# Patient Record
Sex: MALE | Race: WHITE | NOT HISPANIC OR LATINO | Employment: FULL TIME | URBAN - METROPOLITAN AREA
[De-identification: names, ages, dates, MRNs, and addresses within clinical notes are randomized per-mention and may not be internally consistent; named-entity substitution may affect disease eponyms.]

---

## 2017-01-03 RX ORDER — NARATRIPTAN 2.5 MG/1
TABLET ORAL
Qty: 12 TAB | Refills: 2 | Status: SHIPPED | OUTPATIENT
Start: 2017-01-03 | End: 2019-01-07

## 2017-01-03 NOTE — TELEPHONE ENCOUNTER
Was the patient seen in the last year in this department? Yes  4/21/16    Does patient have an active prescription for medications requested? Yes     Received Request Via: Pharmacy     No follow up scheduled at this time.

## 2019-01-07 ENCOUNTER — OFFICE VISIT (OUTPATIENT)
Dept: NEUROLOGY | Facility: MEDICAL CENTER | Age: 44
End: 2019-01-07
Payer: COMMERCIAL

## 2019-01-07 ENCOUNTER — APPOINTMENT (OUTPATIENT)
Dept: NEUROLOGY | Facility: MEDICAL CENTER | Age: 44
End: 2019-01-07
Payer: COMMERCIAL

## 2019-01-07 VITALS
DIASTOLIC BLOOD PRESSURE: 72 MMHG | HEIGHT: 73 IN | WEIGHT: 214 LBS | BODY MASS INDEX: 28.36 KG/M2 | RESPIRATION RATE: 18 BRPM | TEMPERATURE: 97.1 F | HEART RATE: 96 BPM | SYSTOLIC BLOOD PRESSURE: 114 MMHG | OXYGEN SATURATION: 96 %

## 2019-01-07 PROCEDURE — 99213 OFFICE O/P EST LOW 20 MIN: CPT | Performed by: NURSE PRACTITIONER

## 2019-01-07 RX ORDER — KETOROLAC TROMETHAMINE 15.75 MG/1
1-2 SPRAY, METERED NASAL EVERY 8 HOURS PRN
Qty: 5 EACH | Refills: 5 | Status: SHIPPED | OUTPATIENT
Start: 2019-01-07 | End: 2019-05-28

## 2019-01-07 RX ORDER — ACETAMINOPHEN 325 MG/1
650 TABLET ORAL EVERY 4 HOURS PRN
Status: ON HOLD | COMMUNITY
End: 2019-06-03

## 2019-01-07 RX ORDER — IBUPROFEN 200 MG
200 TABLET ORAL EVERY 6 HOURS PRN
COMMUNITY

## 2019-01-07 RX ORDER — KETOROLAC TROMETHAMINE 10 MG/1
10 TABLET, FILM COATED ORAL EVERY 6 HOURS PRN
Qty: 20 TAB | Refills: 1 | Status: SHIPPED | OUTPATIENT
Start: 2019-01-07 | End: 2019-05-28

## 2019-01-07 ASSESSMENT — ENCOUNTER SYMPTOMS
DOUBLE VISION: 0
DEPRESSION: 0
MUSCULOSKELETAL NEGATIVE: 1
NAUSEA: 0
SEIZURES: 0
COUGH: 0
ABDOMINAL PAIN: 0
SORE THROAT: 0
HEADACHES: 1
NERVOUS/ANXIOUS: 0
VOMITING: 0
DIARRHEA: 0
CONSTITUTIONAL NEGATIVE: 1

## 2019-01-07 ASSESSMENT — PATIENT HEALTH QUESTIONNAIRE - PHQ9: CLINICAL INTERPRETATION OF PHQ2 SCORE: 0

## 2019-01-07 ASSESSMENT — PAIN SCALES - GENERAL: PAINLEVEL: 2=MINIMAL-SLIGHT

## 2019-01-07 NOTE — PROGRESS NOTES
Subjective:      Joseph Sloan is a 43 y.o. male who presents with Follow-Up (Chronic migraine)        Last seen almost 3 years ago for diagnosis of migraines.      HPI Migraines are occurring nearly every day.    Migraines will almost always begin by 2pm and consist of a tightness/band-like pain that is in the frontal region of his head.    Tried and failed TPX, and two beta-blockers.  Also, failed a calcium-channel blocker.    No apparent improvement with using Amerge or Zomig NS.    Only using OTC medications for abortive treatment.  If he is able to take a NSAID at the onset of headaches it will keep the headache at a minimum.    No relief with caffeine.    Current Outpatient Prescriptions   Medication Sig Dispense Refill   • acetaminophen (TYLENOL) 325 MG Tab Take 650 mg by mouth every four hours as needed.     • ibuprofen (MOTRIN) 200 MG Tab Take 200 mg by mouth every 6 hours as needed.     • naratriptan (AMERGE) 2.5 MG tablet TAKE 1 TABLET BY MOUTH AS NEEDED FOR MIGRAINE FOR UP TO 1 DOSE (Patient not taking: Reported on 1/7/2019) 12 Tab 2   • sumatriptan (IMITREX) 100 MG tablet Take 100 mg by mouth Once PRN for Migraine.     • cyclobenzaprine (FLEXERIL) 10 MG Tab Take 10 mg by mouth 3 times a day as needed.     • zolmitriptan (ZOMIG) 5 MG nasal solution Use one spray at onset of headache, may repeat dose in 2 hours if unrelieved. (Patient not taking: Reported on 1/7/2019) 12 Each 2     No current facility-administered medications for this visit.        Review of Systems   Constitutional: Negative.    HENT: Negative for hearing loss, nosebleeds and sore throat.         No recent head injury.   Eyes: Negative for double vision.        No new loss of vision.   Respiratory: Negative for cough.         No recent lung infections.   Cardiovascular: Negative for chest pain.   Gastrointestinal: Negative for abdominal pain, diarrhea, nausea and vomiting.   Genitourinary: Negative.    Musculoskeletal: Negative.    Skin:  "Negative.    Neurological: Positive for headaches. Negative for seizures.   Endo/Heme/Allergies:        No history of endocrine dysfunction.  No new problems.   Psychiatric/Behavioral: Negative for depression. The patient is not nervous/anxious.         No recent mood changes.          Objective:     /72   Pulse 96   Temp 36.2 °C (97.1 °F)   Resp 18   Ht 1.854 m (6' 1\")   Wt 97.1 kg (214 lb)   SpO2 96%   BMI 28.23 kg/m²      Physical Exam   Constitutional: He is oriented to person, place, and time. He appears well-developed and well-nourished.   HENT:   Head: Normocephalic and atraumatic.   Nose: Nose normal.   No new hearing loss.   Eyes: Pupils are equal, round, and reactive to light.   No double vision or loss of vision.   Neck: Normal range of motion.   Cardiovascular: Normal rate and regular rhythm.    No recent chest pain.   Pulmonary/Chest: Effort normal.   Abdominal: There is no tenderness.   Genitourinary:   Genitourinary Comments: No recent infections.   Musculoskeletal: Normal range of motion.   Lymphadenopathy:     He has no cervical adenopathy.   Neurological: He is alert and oriented to person, place, and time. He has normal strength and normal reflexes. No cranial nerve deficit.   No observable changes in neurologic status.  See initial new patient examination for details.     Skin: Skin is warm and dry.   Psychiatric: He has a normal mood and affect. His speech is normal. His mood appears not anxious. He does not exhibit a depressed mood.               Assessment/Plan:     Chronic migraine:  Long-standing history of headaches since childhood.  Continues to experience at least 4 migraine days per week, total of 16+ per month.  Unrelieved with over-the-counter medications.     Ms Young has chronic daily migraines, defined as having 15 or more headaches days per month over a minimum of the last three months. Episodes last more than 4 hours (if untreated). Previous treatments for at least " three months have included two different beta-blockers such as propranolol, anti-epileptics such as Topamax, NSAIDs such as naproxen, and multiple triptans. The patients has not responded to any of these treatments. At this point the only option left would be to use BTX injections for chronic migraine.    He does not appreciate the effect of triptans.  New Rx for Toradol and Sprix (NS Toradol) are provided today.    Return for follow-up with initiation of Botox or consideration of the CGRP modulator would be an option.

## 2019-02-19 ENCOUNTER — OFFICE VISIT (OUTPATIENT)
Dept: NEUROLOGY | Facility: MEDICAL CENTER | Age: 44
End: 2019-02-19
Payer: COMMERCIAL

## 2019-02-19 VITALS
OXYGEN SATURATION: 96 % | WEIGHT: 231 LBS | BODY MASS INDEX: 30.62 KG/M2 | HEIGHT: 73 IN | TEMPERATURE: 97.7 F | SYSTOLIC BLOOD PRESSURE: 134 MMHG | DIASTOLIC BLOOD PRESSURE: 88 MMHG | HEART RATE: 99 BPM

## 2019-02-19 PROCEDURE — 64615 CHEMODENERV MUSC MIGRAINE: CPT | Performed by: NURSE PRACTITIONER

## 2019-02-19 NOTE — PROGRESS NOTES
"Subjective:      Joseph Sloan is a 43 y.o. male who presents with Procedure (botox)            HPI  Here for Botox initiation today.    Current Outpatient Prescriptions   Medication Sig Dispense Refill   • acetaminophen (TYLENOL) 325 MG Tab Take 650 mg by mouth every four hours as needed.     • ibuprofen (MOTRIN) 200 MG Tab Take 200 mg by mouth every 6 hours as needed.     • ketorolac (TORADOL) 10 MG Tab Take 1 Tab by mouth every 6 hours as needed for Moderate Pain. 20 Tab 1   • Ketorolac Tromethamine (SPRIX) 15.75 MG/SPRAY Solution Spray 1-2 Sprays in nose every 8 hours as needed. Do not exceed more than 2 sprays in 24 hours. 5 Each 5     Current Facility-Administered Medications   Medication Dose Route Frequency Provider Last Rate Last Dose   • onabotulinum toxin type A SOLR 155 Units  155 Units Injection Once ZELALEM Solares       Objective:     /88   Pulse 99   Temp 36.5 °C (97.7 °F) (Temporal)   Ht 1.854 m (6' 1\")   Wt 104.8 kg (231 lb)   SpO2 96%   BMI 30.48 kg/m²      Physical Exam            Assessment/Plan:     Chronic Migraine:  Here for initiation of Botox today.    I treated this patient in clinic today with BotoxA 155 units according to the dosing/injection paradigm currently mandated by the FDA for the management of chronic migraine. Specifically, I injected 5 units to the procerus, 5 units to the corrugators bilaterally, a total of 20 units to the frontalis musculature, 20 units to the temporalis bilaterally, 15 units to the occipitalis bilaterally, 10 units to the cervical paraspinals bilaterally and 15 units to the trapezius musculature bilaterally. The remainder of the Botox 200 units mixed but not administered was discarded as wastage per FDA guidelines.    Return for follow-up in 3 months for 2nd set of Botox.          "

## 2019-05-14 ENCOUNTER — OFFICE VISIT (OUTPATIENT)
Dept: NEUROLOGY | Facility: MEDICAL CENTER | Age: 44
End: 2019-05-14
Payer: COMMERCIAL

## 2019-05-14 VITALS
HEART RATE: 83 BPM | WEIGHT: 231 LBS | HEIGHT: 73 IN | OXYGEN SATURATION: 98 % | SYSTOLIC BLOOD PRESSURE: 128 MMHG | BODY MASS INDEX: 30.62 KG/M2 | RESPIRATION RATE: 16 BRPM | DIASTOLIC BLOOD PRESSURE: 74 MMHG | TEMPERATURE: 98.7 F

## 2019-05-14 PROCEDURE — 64615 CHEMODENERV MUSC MIGRAINE: CPT | Performed by: NURSE PRACTITIONER

## 2019-05-14 NOTE — PROGRESS NOTES
Subjective:      Joseph Sloan is a 43 y.o. male who presents with Botox Injection (Chronic migraine)            HPI  Here for Botox today.    Has noticed an improvement in frequency of migraines.  Headache free for the past 2 weeks.    Current Outpatient Prescriptions   Medication Sig Dispense Refill   • acetaminophen (TYLENOL) 325 MG Tab Take 650 mg by mouth every four hours as needed.     • ibuprofen (MOTRIN) 200 MG Tab Take 200 mg by mouth every 6 hours as needed.     • ketorolac (TORADOL) 10 MG Tab Take 1 Tab by mouth every 6 hours as needed for Moderate Pain. 20 Tab 1   • Ketorolac Tromethamine (SPRIX) 15.75 MG/SPRAY Solution Spray 1-2 Sprays in nose every 8 hours as needed. Do not exceed more than 2 sprays in 24 hours. (Patient not taking: Reported on 5/14/2019) 5 Each 5     No current facility-administered medications for this visit.            ROS       Objective:     There were no vitals taken for this visit.     Physical Exam            Assessment/Plan:     Chronic Migraine:  Botox therapy has reduced patient’s migraines by more than 7 days and/or 100 hours per month.     I treated this patient in clinic today with BotoxA 155 units according to the dosing/injection paradigm currently mandated by the FDA for the management of chronic migraine. Specifically, I injected 5 units to the procerus, 5 units to the corrugators bilaterally, a total of 20 units to the frontalis musculature, 20 units to the temporalis bilaterally, 15 units to the occipitalis bilaterally, 10 units to the cervical paraspinals bilaterally and 15 units to the trapezius musculature bilaterally. The remainder of the Botox 200 units mixed but not administered was discarded as wastage per FDA guidelines.     Education/counseling/reduction in medications if pt has medication overuse headache; Frequency of headaches is >15 days monthly with at least 8 migraines monthly; Migraines include at least two of the following: worsened with activity or  avoidance of activity with migraines (ie they go lie down), moderate to severe pain intensity, pulsing headache, unilateral headache AND they have either nausea or vomiting OR sensitivity to light and sound.     Although this patient is responding to botox they are NOT migraine free, however, and it is my recommendation botox be continued at this time.    Return for follow-up in 3 months for continuation of Botox, 3rd set.

## 2019-06-03 PROBLEM — S43.431A SUPERIOR GLENOID LABRUM LESION OF RIGHT SHOULDER: Status: ACTIVE | Noted: 2019-06-03

## 2019-08-06 ENCOUNTER — OFFICE VISIT (OUTPATIENT)
Dept: NEUROLOGY | Facility: MEDICAL CENTER | Age: 44
End: 2019-08-06
Payer: COMMERCIAL

## 2019-08-06 VITALS
DIASTOLIC BLOOD PRESSURE: 74 MMHG | HEIGHT: 73 IN | HEART RATE: 75 BPM | BODY MASS INDEX: 28.49 KG/M2 | WEIGHT: 215 LBS | SYSTOLIC BLOOD PRESSURE: 126 MMHG | TEMPERATURE: 98.5 F | OXYGEN SATURATION: 98 % | RESPIRATION RATE: 16 BRPM

## 2019-08-06 PROCEDURE — 64615 CHEMODENERV MUSC MIGRAINE: CPT | Performed by: NURSE PRACTITIONER

## 2019-08-06 RX ORDER — CYCLOBENZAPRINE HCL 10 MG
5-10 TABLET ORAL
Qty: 30 TAB | Refills: 0 | Status: SHIPPED | OUTPATIENT
Start: 2019-08-06 | End: 2019-10-04 | Stop reason: SDUPTHER

## 2019-08-06 RX ORDER — KETOROLAC TROMETHAMINE 30 MG/ML
60 INJECTION, SOLUTION INTRAMUSCULAR; INTRAVENOUS ONCE
Status: COMPLETED | OUTPATIENT
Start: 2019-08-06 | End: 2019-08-06

## 2019-08-06 RX ADMIN — KETOROLAC TROMETHAMINE 60 MG: 30 INJECTION, SOLUTION INTRAMUSCULAR; INTRAVENOUS at 10:09

## 2019-08-06 NOTE — PROGRESS NOTES
"Subjective:      Joseph Sloan is a 43 y.o. male who presents with Botox Injection (Chronic migraine)            HPI  Here for Botox today.    Notices muscle tension building to migraines for 2-3 weeks prior to Botox and at least 10 days after each Botox injection set.      Current Outpatient Medications   Medication Sig Dispense Refill   • cyclobenzaprine (FLEXERIL) 10 MG Tab Take 0.5-1 Tabs by mouth 1 time daily as needed. 30 Tab 0   • acetaminophen (TYLENOL) 500 MG Tab Take 2 Tabs by mouth every 8 hours. Take for pain relief     • onabotulinum toxin type A (BOTOX) 100 UNIT Recon Soln 100 Units by Intramuscular route every 90 days.     • ibuprofen (MOTRIN) 200 MG Tab Take 200 mg by mouth every 6 hours as needed.     • docusate sodium (COLACE) 100 MG Cap Take 1 Cap by mouth 2 times a day. Take while taking narcotic medication after surgery 60 Cap 0   • diphenhydrAMINE (BENADRYL) 25 MG capsule Take 1 Cap by mouth every 6 hours as needed for Itching. 30 Cap      Current Facility-Administered Medications   Medication Dose Route Frequency Provider Last Rate Last Dose   • ketorolac (TORADOL) injection 60 mg  60 mg Intramuscular Once Maryan Valentine A.PFilomenaNFilomena PRINCE       Objective:     /74 (BP Location: Left arm, Patient Position: Sitting, BP Cuff Size: Adult)   Pulse 75   Temp 36.9 °C (98.5 °F) (Temporal)   Resp 16   Ht 1.854 m (6' 0.99\")   Wt 97.5 kg (215 lb)   SpO2 98%   BMI 28.37 kg/m²      Physical Exam            Assessment/Plan:       Chronic Migraine:  Botox therapy has reduced patient’s migraines by more than 7 days and/or 100 hours per month.  Additionally pt has noted a reduction in the amount of medication they are taking to treat their migraines and/or they are having a reduction in intractable migraine/status migrainosis which can result in urgent care or ER visits.    Documentation of patient's symptoms with migraine are included in the initial note with this patient including the " following:  Education/counseling/reduction in medications if pt has medication overuse headache;  Frequency of headaches is >15 days monthly with at least 8 migraines monthly;  Migraines include at least two of the following: worsened with activity or avoidance of activity with migraines (ie they go lie down), moderate to severe pain intensity, pulsing headache, unilateral headache AND they have either nausea or vomiting OR sensitivity to light and sound    Although this patient is responding to botox they are NOT migraine free, however, and it is my recommendation botox be continued at this time    I treated this patient in clinic today with BotoxA 155 units according to the dosing/injection paradigm currently mandated by the FDA for the management of chronic migraine. Specifically, I injected 5 units to the procerus, 5 units to the corrugators bilaterally, a total of 20 units to the frontalis musculature, 20 units to the temporalis bilaterally, 15 units to the occipitalis bilaterally, 10 units to the cervical paraspinals bilaterally and 15 units to the trapezius musculature bilaterally. The remainder of the Botox 200 units mixed but not administered was discarded as wastage per FDA guidelines.    Toradol 60mg IM provided per MA.    New Rx for cyclobenzaprine 10mg tablets provided.  May consider skelaxin if he receives minimal benefit from the flexeril.    Return for follow-up in 12 weeks for serial set of Botox.

## 2019-10-29 ENCOUNTER — OFFICE VISIT (OUTPATIENT)
Dept: NEUROLOGY | Facility: MEDICAL CENTER | Age: 44
End: 2019-10-29
Payer: COMMERCIAL

## 2019-10-29 VITALS
HEIGHT: 73 IN | DIASTOLIC BLOOD PRESSURE: 90 MMHG | OXYGEN SATURATION: 96 % | HEART RATE: 84 BPM | SYSTOLIC BLOOD PRESSURE: 140 MMHG | WEIGHT: 223 LBS | BODY MASS INDEX: 29.55 KG/M2 | RESPIRATION RATE: 16 BRPM | TEMPERATURE: 98.4 F

## 2019-10-29 PROCEDURE — 64615 CHEMODENERV MUSC MIGRAINE: CPT | Performed by: NURSE PRACTITIONER

## 2019-10-29 NOTE — PROGRESS NOTES
Subjective:      Joseph Sloan is a 43 y.o. male who presents with No chief complaint on file.            HPI  Here for Botox today.     Notices muscle tension building to migraines for 2-3 weeks prior to Botox and at least 10 days after each Botox injection set.     Current Outpatient Medications   Medication Sig Dispense Refill   • cyclobenzaprine (FLEXERIL) 10 MG Tab TAKE 1/2 TO 1 TABLET BY MOUTH EVERY DAY AS NEEDED 15 Tab 0   • acetaminophen (TYLENOL) 500 MG Tab Take 2 Tabs by mouth every 8 hours. Take for pain relief     • onabotulinum toxin type A (BOTOX) 100 UNIT Recon Soln 100 Units by Intramuscular route every 90 days.     • ibuprofen (MOTRIN) 200 MG Tab Take 200 mg by mouth every 6 hours as needed.       Current Facility-Administered Medications   Medication Dose Route Frequency Provider Last Rate Last Dose   • onabotulinum toxin type A SOLR 155 Units  155 Units Injection Once MARKUS SolaresPCHELSEA PRINCE       Objective:     There were no vitals taken for this visit.     Physical Exam            Assessment/Plan:     Chronic Migraine:  Botox therapy has reduced patient’s migraines by more than 7 days and/or 100 hours per month.  Additionally pt has noted a reduction in the amount of medication they are taking to treat their migraines and/or they are having a reduction in intractable migraine/status migrainosis which can result in urgent care or ER visits.     Documentation of patient's symptoms with migraine are included in the initial note with this patient including the following:  Education/counseling/reduction in medications if pt has medication overuse headache;  Frequency of headaches is >15 days monthly with at least 8 migraines monthly;  Migraines include at least two of the following: worsened with activity or avoidance of activity with migraines (ie they go lie down), moderate to severe pain intensity, pulsing headache, unilateral headache AND they have either nausea or vomiting OR  sensitivity to light and sound     Although this patient is responding to botox they are NOT migraine free, however, and it is my recommendation botox be continued at this time     I treated this patient in clinic today with BotoxA 155 units according to the dosing/injection paradigm currently mandated by the FDA for the management of chronic migraine. Specifically, I injected 5 units to the procerus, 5 units to the corrugators bilaterally, a total of 20 units to the frontalis musculature, 20 units to the temporalis bilaterally, 15 units to the occipitalis bilaterally, 10 units to the cervical paraspinals bilaterally and 15 units to the trapezius musculature bilaterally. The remainder of the Botox 200 units mixed but not administered was discarded as wastage per FDA guidelines.      Return for follow-up in 12 weeks for serial set of Botox.

## 2019-11-06 RX ORDER — CYCLOBENZAPRINE HCL 10 MG
TABLET ORAL
Qty: 20 TAB | Refills: 0 | Status: SHIPPED | OUTPATIENT
Start: 2019-11-06 | End: 2020-01-06

## 2019-11-06 NOTE — TELEPHONE ENCOUNTER
Was the patient seen in the last year in this department? Yes    Does patient have an active prescription for medications requested? Yes    Received Request Via: Pharmacy   Pt sees Maryan RUDD

## 2019-12-23 ENCOUNTER — TELEPHONE (OUTPATIENT)
Dept: NEUROLOGY | Facility: MEDICAL CENTER | Age: 44
End: 2019-12-23

## 2019-12-23 NOTE — TELEPHONE ENCOUNTER
Patient called and stated that he has re-located to [place] and they are requiring a referral from current neuro.    It will be to [facility] in [place]   Fax number: [phone]    Please advise     Patient notified that provider out of office until 12/30

## 2020-01-21 ENCOUNTER — APPOINTMENT (OUTPATIENT)
Dept: NEUROLOGY | Facility: MEDICAL CENTER | Age: 45
End: 2020-01-21
Payer: COMMERCIAL